# Patient Record
Sex: FEMALE | Race: WHITE | NOT HISPANIC OR LATINO | URBAN - METROPOLITAN AREA
[De-identification: names, ages, dates, MRNs, and addresses within clinical notes are randomized per-mention and may not be internally consistent; named-entity substitution may affect disease eponyms.]

---

## 2019-02-21 ENCOUNTER — OFFICE VISIT (OUTPATIENT)
Dept: URBAN - METROPOLITAN AREA CLINIC 48 | Facility: CLINIC | Age: 64
End: 2019-02-21
Payer: MEDICARE

## 2019-02-21 PROCEDURE — 92134 CPTRZ OPH DX IMG PST SGM RTA: CPT | Performed by: OPHTHALMOLOGY

## 2019-02-21 PROCEDURE — 99214 OFFICE O/P EST MOD 30 MIN: CPT | Performed by: OPHTHALMOLOGY

## 2019-02-21 RX ORDER — OFLOXACIN 3 MG/ML
0.3 % SOLUTION/ DROPS OPHTHALMIC
Qty: 1 | Refills: 0 | Status: INACTIVE
Start: 2019-02-21 | End: 2019-07-26

## 2019-02-21 RX ORDER — PREDNISOLONE ACETATE 10 MG/ML
1 % SUSPENSION/ DROPS OPHTHALMIC
Qty: 1 | Refills: 1 | Status: INACTIVE
Start: 2019-02-21 | End: 2019-08-15

## 2019-02-21 ASSESSMENT — INTRAOCULAR PRESSURE
OD: 15
OS: 16

## 2019-02-21 NOTE — IMPRESSION/PLAN
Impression: Macular cyst, hole, or pseudohole, right eye: H35.341. Plan: Rec surgical intervention. Discussed main goal of surgery is to prevent worsening and diminish distortions. Discussed uncertain visual prognosis- cannot predict if and how much VA will improve. Discussed final vision may take months to achieve. R/B/A discussed including but not limited to bleeding, infection, retinal tear/detachment, increased or decreased IOP, cataract if phakic, failure to accomplish surgical goals, need for repeat surgery, loss of vision, loss of eye, anesthetic complications (to be further discussed with anesthesiologist on day of sx), death. Discussed post op restricted positioning as well as travel restrictions (ie- no flying or traveling by car to [facility]). Pt would like to proceed. RL2. 
Plan for PPV, MP, gas OD. (Oflox/PF QID -start day after sx)

## 2019-02-21 NOTE — IMPRESSION/PLAN
Impression: Puckering of macula, left eye: H35.372. Plan: ERM w/lamellar hole; Will cont to observe. Instructed to call clinic urgently if new/worsening metamorphopsia or scotoma.

## 2019-03-15 ENCOUNTER — OFFICE VISIT (OUTPATIENT)
Dept: URBAN - METROPOLITAN AREA CLINIC 48 | Facility: CLINIC | Age: 64
End: 2019-03-15
Payer: MEDICARE

## 2019-03-15 DIAGNOSIS — H43.12 VITREOUS HEMORRHAGE, LEFT EYE: Primary | ICD-10-CM

## 2019-03-15 PROCEDURE — 76512 OPH US DX B-SCAN: CPT | Performed by: OPTOMETRIST

## 2019-03-15 PROCEDURE — 92014 COMPRE OPH EXAM EST PT 1/>: CPT | Performed by: OPTOMETRIST

## 2019-03-15 ASSESSMENT — INTRAOCULAR PRESSURE
OD: 17
OS: 20

## 2019-03-15 NOTE — IMPRESSION/PLAN
Impression: Vitreous hemorrhage, left eye: H43.12. Retina grossly flat and intact - poor view due to heme - 
 Plan: BScan done today - Refer to Dr Caryle Nixon next week for eval and recommendation for Tx as needed - Pt already scheduled for vitrectomy and macular ERM peel.

## 2019-03-21 ENCOUNTER — OFFICE VISIT (OUTPATIENT)
Dept: URBAN - METROPOLITAN AREA CLINIC 48 | Facility: CLINIC | Age: 64
End: 2019-03-21
Payer: MEDICARE

## 2019-03-21 PROCEDURE — 67028 INJECTION EYE DRUG: CPT | Performed by: OPHTHALMOLOGY

## 2019-03-21 PROCEDURE — 92134 CPTRZ OPH DX IMG PST SGM RTA: CPT | Performed by: OPHTHALMOLOGY

## 2019-03-21 PROCEDURE — 99214 OFFICE O/P EST MOD 30 MIN: CPT | Performed by: OPHTHALMOLOGY

## 2019-03-21 ASSESSMENT — INTRAOCULAR PRESSURE
OD: 16
OS: 18

## 2019-03-21 NOTE — IMPRESSION/PLAN
Impression: Vitreous hemorrhage, left eye: H43.12. Plan: VH w/poss NVE seen in today's exam. Recommend avastin injection today followed by surgery considering pt is monocular. Pt would like to proceed. R/B/A of antivegf injection discussed with pt. Consent obtained. Also discussed R/B/A's of procedure including bleeding, infection, retinal tear/detachment, increased or decreased IOP, cataract, failure to accomplish surgical goals, need for repeat surgery, loss of vision, loss of eye, anesthetic complications (to be further reviewed with anesthesiologist on day of surgery), death. RL2.  Plan for PPV, EL, OS (pt has gtts already, Oflx/PF QID -start day after sx)

## 2019-03-21 NOTE — IMPRESSION/PLAN
Impression: Macular cyst, hole, or pseudohole, right eye: H35.341. Plan: Will hold off on PPV, MP OD until sometime in June 2019. Observe for now.

## 2019-04-04 ENCOUNTER — SURGERY (OUTPATIENT)
Dept: URBAN - METROPOLITAN AREA SURGERY 26 | Facility: SURGERY | Age: 64
End: 2019-04-04
Payer: MEDICARE

## 2019-04-04 PROCEDURE — 67039 LASER TREATMENT OF RETINA: CPT | Performed by: OPHTHALMOLOGY

## 2019-04-05 ENCOUNTER — POST-OPERATIVE VISIT (OUTPATIENT)
Dept: URBAN - METROPOLITAN AREA CLINIC 48 | Facility: CLINIC | Age: 64
End: 2019-04-05
Payer: MEDICARE

## 2019-04-05 PROCEDURE — 99024 POSTOP FOLLOW-UP VISIT: CPT | Performed by: OPHTHALMOLOGY

## 2019-04-05 ASSESSMENT — INTRAOCULAR PRESSURE: OS: 16

## 2019-04-10 ENCOUNTER — POST-OPERATIVE VISIT (OUTPATIENT)
Dept: URBAN - METROPOLITAN AREA CLINIC 48 | Facility: CLINIC | Age: 64
End: 2019-04-10
Payer: MEDICARE

## 2019-04-10 PROCEDURE — 99024 POSTOP FOLLOW-UP VISIT: CPT | Performed by: OPHTHALMOLOGY

## 2019-04-10 ASSESSMENT — INTRAOCULAR PRESSURE: OS: 9

## 2019-05-02 ENCOUNTER — POST-OPERATIVE VISIT (OUTPATIENT)
Dept: URBAN - METROPOLITAN AREA CLINIC 48 | Facility: CLINIC | Age: 64
End: 2019-05-02
Payer: MEDICARE

## 2019-05-02 PROCEDURE — 99024 POSTOP FOLLOW-UP VISIT: CPT | Performed by: OPHTHALMOLOGY

## 2019-05-02 ASSESSMENT — INTRAOCULAR PRESSURE
OD: 12
OS: 11

## 2019-05-30 ENCOUNTER — POST-OPERATIVE VISIT (OUTPATIENT)
Dept: URBAN - METROPOLITAN AREA CLINIC 48 | Facility: CLINIC | Age: 64
End: 2019-05-30
Payer: MEDICARE

## 2019-05-30 PROCEDURE — 99024 POSTOP FOLLOW-UP VISIT: CPT | Performed by: OPHTHALMOLOGY

## 2019-05-30 PROCEDURE — 67028 INJECTION EYE DRUG: CPT | Performed by: OPHTHALMOLOGY

## 2019-05-30 ASSESSMENT — INTRAOCULAR PRESSURE
OS: 14
OD: 17

## 2019-06-19 ENCOUNTER — OFFICE VISIT (OUTPATIENT)
Dept: URBAN - METROPOLITAN AREA CLINIC 48 | Facility: CLINIC | Age: 64
End: 2019-06-19
Payer: MEDICARE

## 2019-06-19 DIAGNOSIS — H35.341 MACULAR HOLE OF RIGHT EYE: ICD-10-CM

## 2019-06-19 DIAGNOSIS — H25.811 COMBINED FORMS OF AGE-RELATED CATARACT, RIGHT EYE: Primary | ICD-10-CM

## 2019-06-19 PROCEDURE — 92014 COMPRE OPH EXAM EST PT 1/>: CPT | Performed by: OPHTHALMOLOGY

## 2019-06-19 ASSESSMENT — VISUAL ACUITY
OS: 20/40
OD: 20/400

## 2019-06-19 ASSESSMENT — KERATOMETRY
OS: 43.13
OD: 43.13

## 2019-06-19 ASSESSMENT — INTRAOCULAR PRESSURE
OS: 11
OD: 14

## 2019-06-19 NOTE — IMPRESSION/PLAN
Impression: Macular hole of right eye: H35.341. Plan: Discussed diagnosis in detail with patient. Reassured patient of current condition and treatment.  Patient to continue monitoring with Dr Concepcion Garcia.

## 2019-06-19 NOTE — IMPRESSION/PLAN
Impression: Combined forms of age-related cataract, right eye: H25.811. Plan: The patient has a visually significant cataract in right eye, after discussion with the patient and careful examination it has been determined that a cataract in right eye is accounting for a significant amount of the patient's visual symptoms. Cataract surgery and the associated risks, benefits, alternatives, expectations, and recovery were discussed in detail with the patient. All questions were answered. The patient understands that there will be a guarded prognosis of visual improvement, given macular hole in right eye. The patient desires cataract surgery in right eye. Patient desires standard lens for distance target. Patient is to start  Prednisolone, Ketorolac, Ofloxacin  QID or Pred-Gati-Brim to operative eye one day prior to surgery, and continue after surgery. Schedule cataract surgery in right eye only.  RL 2

## 2019-06-27 ENCOUNTER — PROCEDURE (OUTPATIENT)
Dept: URBAN - METROPOLITAN AREA CLINIC 48 | Facility: CLINIC | Age: 64
End: 2019-06-27
Payer: MEDICARE

## 2019-06-27 PROCEDURE — 67028 INJECTION EYE DRUG: CPT | Performed by: OPHTHALMOLOGY

## 2019-07-15 ENCOUNTER — TESTING ONLY (OUTPATIENT)
Dept: URBAN - METROPOLITAN AREA CLINIC 48 | Facility: CLINIC | Age: 64
End: 2019-07-15
Payer: MEDICARE

## 2019-07-15 DIAGNOSIS — H25.813 COMBINED FORMS OF AGE-RELATED CATARACT, BILATERAL: Primary | ICD-10-CM

## 2019-07-15 PROCEDURE — 92136 OPHTHALMIC BIOMETRY: CPT | Performed by: OPHTHALMOLOGY

## 2019-07-15 RX ORDER — KETOROLAC TROMETHAMINE 5 MG/ML
0.5 % SOLUTION OPHTHALMIC
Qty: 1 | Refills: 1 | Status: INACTIVE
Start: 2019-07-15 | End: 2019-07-26

## 2019-07-15 RX ORDER — KETOROLAC TROMETHAMINE 5 MG/ML
0.5 % SOLUTION OPHTHALMIC
Qty: 1 | Refills: 1 | Status: INACTIVE
Start: 2019-07-15 | End: 2019-07-15

## 2019-07-15 ASSESSMENT — PACHYMETRY
OS: 23.32
OD: 23.25
OD: 3.04
OS: 3.08

## 2019-07-24 ENCOUNTER — SURGERY (OUTPATIENT)
Dept: URBAN - METROPOLITAN AREA SURGERY 26 | Facility: SURGERY | Age: 64
End: 2019-07-24
Payer: MEDICARE

## 2019-07-24 ENCOUNTER — POST-OPERATIVE VISIT (OUTPATIENT)
Dept: URBAN - METROPOLITAN AREA CLINIC 48 | Facility: CLINIC | Age: 64
End: 2019-07-24

## 2019-07-24 PROCEDURE — 99024 POSTOP FOLLOW-UP VISIT: CPT | Performed by: OPHTHALMOLOGY

## 2019-07-24 PROCEDURE — 66984 XCAPSL CTRC RMVL W/O ECP: CPT | Performed by: OPHTHALMOLOGY

## 2019-07-24 ASSESSMENT — INTRAOCULAR PRESSURE
OD: 20
OD: 20

## 2019-07-26 ENCOUNTER — OFFICE VISIT (OUTPATIENT)
Dept: URBAN - METROPOLITAN AREA CLINIC 48 | Facility: CLINIC | Age: 64
End: 2019-07-26
Payer: MEDICARE

## 2019-07-26 PROCEDURE — 92134 CPTRZ OPH DX IMG PST SGM RTA: CPT | Performed by: OPHTHALMOLOGY

## 2019-07-26 PROCEDURE — 99214 OFFICE O/P EST MOD 30 MIN: CPT | Performed by: OPHTHALMOLOGY

## 2019-07-26 ASSESSMENT — INTRAOCULAR PRESSURE
OS: 12
OD: 13

## 2019-07-26 NOTE — IMPRESSION/PLAN
Impression: Macular hole of right eye: H35.341. Plan:  Discussed diagnosis with patient. The clinical exam and OCT are consistent with Macular Hole. Given the patients current symptoms and difficulties with daily activities surgical correction is recommended. Recommend sx, after a through discussion of surgical R/B/A. The patient understands the potential risks of sx, including (but not limited to) bleeding, pain, infection, loss of vision, loss of eye and possible need for more sx. The patient was also informed of post operative altitude with caution with face down positioning with gas in the eye. The patient understand if the hole closes successfully the vision usually improves but does not return to normal an improvement may take several months to a year. The patient elects to proceed with sx.  RL-2

## 2019-07-26 NOTE — IMPRESSION/PLAN
Impression: Type 2 diabetes mellitus w/ proliferative diabetic retinopathy w/ macular edema, left eye: D15.0734. Plan: OCT ordered and performed today. The clinical exam is consistent with proliferative diabetic retinopathy. Discussed diagnosis with patient. Recommend close observation at this time W/ no injection at this time recommend re-eval in 1 mth. Discussed risk of progression with the present condition. The patient was advised to maintain tight blood sugar control, blood pressure and lipid control. Patient agrees with plan.

## 2019-07-30 ENCOUNTER — POST-OPERATIVE VISIT (OUTPATIENT)
Dept: URBAN - METROPOLITAN AREA CLINIC 48 | Facility: CLINIC | Age: 64
End: 2019-07-30
Payer: MEDICARE

## 2019-07-30 PROCEDURE — 99024 POSTOP FOLLOW-UP VISIT: CPT | Performed by: OPTOMETRIST

## 2019-07-30 ASSESSMENT — INTRAOCULAR PRESSURE: OD: 12

## 2019-08-14 ENCOUNTER — SURGERY (OUTPATIENT)
Dept: URBAN - METROPOLITAN AREA SURGERY 26 | Facility: SURGERY | Age: 64
End: 2019-08-14
Payer: MEDICARE

## 2019-08-14 PROCEDURE — 67042 VIT FOR MACULAR HOLE: CPT | Performed by: OPHTHALMOLOGY

## 2019-08-15 ENCOUNTER — POST-OPERATIVE VISIT (OUTPATIENT)
Dept: URBAN - METROPOLITAN AREA CLINIC 48 | Facility: CLINIC | Age: 64
End: 2019-08-15

## 2019-08-15 PROCEDURE — 99024 POSTOP FOLLOW-UP VISIT: CPT | Performed by: OPTOMETRIST

## 2019-08-15 RX ORDER — PREDNISOLONE ACETATE 10 MG/ML
1 % SUSPENSION/ DROPS OPHTHALMIC
Qty: 1 | Refills: 1 | Status: INACTIVE
Start: 2019-08-15 | End: 2019-09-20

## 2019-08-15 RX ORDER — OFLOXACIN 3 MG/ML
0.3 % SOLUTION/ DROPS OPHTHALMIC
Qty: 1 | Refills: 0 | Status: INACTIVE
Start: 2019-08-15 | End: 2019-09-20

## 2019-08-15 ASSESSMENT — INTRAOCULAR PRESSURE: OD: 9

## 2019-08-21 ENCOUNTER — POST-OPERATIVE VISIT (OUTPATIENT)
Dept: URBAN - METROPOLITAN AREA CLINIC 48 | Facility: CLINIC | Age: 64
End: 2019-08-21
Payer: MEDICARE

## 2019-08-21 DIAGNOSIS — H34.8320 TRIB RTNL VEIN OCCLUSION, LEFT EYE, WITH MACULAR EDEMA: ICD-10-CM

## 2019-08-21 PROCEDURE — 67028 INJECTION EYE DRUG: CPT | Performed by: OPHTHALMOLOGY

## 2019-08-21 PROCEDURE — 99024 POSTOP FOLLOW-UP VISIT: CPT | Performed by: OPHTHALMOLOGY

## 2019-08-21 ASSESSMENT — INTRAOCULAR PRESSURE
OD: 12
OS: 11

## 2019-09-20 ENCOUNTER — POST-OPERATIVE VISIT (OUTPATIENT)
Dept: URBAN - METROPOLITAN AREA CLINIC 48 | Facility: CLINIC | Age: 64
End: 2019-09-20

## 2019-09-20 PROCEDURE — 99024 POSTOP FOLLOW-UP VISIT: CPT | Performed by: OPHTHALMOLOGY

## 2019-09-20 RX ORDER — PREDNISOLONE ACETATE 10 MG/ML
1 % SUSPENSION/ DROPS OPHTHALMIC
Qty: 1 | Refills: 1 | Status: INACTIVE
Start: 2019-09-20 | End: 2019-10-23

## 2019-09-20 RX ORDER — KETOROLAC TROMETHAMINE 5 MG/ML
0.5 % SOLUTION OPHTHALMIC
Qty: 1 | Refills: 1 | Status: INACTIVE
Start: 2019-09-20 | End: 2020-01-24

## 2019-09-20 ASSESSMENT — INTRAOCULAR PRESSURE
OS: 12
OD: 13

## 2019-10-02 ENCOUNTER — POST-OPERATIVE VISIT (OUTPATIENT)
Dept: URBAN - METROPOLITAN AREA CLINIC 48 | Facility: CLINIC | Age: 64
End: 2019-10-02

## 2019-10-02 PROCEDURE — 99024 POSTOP FOLLOW-UP VISIT: CPT | Performed by: OPHTHALMOLOGY

## 2019-10-02 ASSESSMENT — INTRAOCULAR PRESSURE
OD: 17
OS: 14

## 2019-10-23 ENCOUNTER — POST-OPERATIVE VISIT (OUTPATIENT)
Dept: URBAN - METROPOLITAN AREA CLINIC 48 | Facility: CLINIC | Age: 64
End: 2019-10-23
Payer: MEDICARE

## 2019-10-23 PROCEDURE — 99024 POSTOP FOLLOW-UP VISIT: CPT | Performed by: OPHTHALMOLOGY

## 2019-10-23 RX ORDER — PREDNISOLONE ACETATE 10 MG/ML
1 % SUSPENSION/ DROPS OPHTHALMIC
Qty: 1 | Refills: 2 | Status: INACTIVE
Start: 2019-10-23 | End: 2019-11-06

## 2019-10-23 ASSESSMENT — INTRAOCULAR PRESSURE
OD: 16
OS: 12

## 2019-12-04 ENCOUNTER — OFFICE VISIT (OUTPATIENT)
Dept: URBAN - METROPOLITAN AREA CLINIC 48 | Facility: CLINIC | Age: 64
End: 2019-12-04
Payer: MEDICARE

## 2019-12-04 DIAGNOSIS — H35.372 PUCKERING OF MACULA, LEFT EYE: ICD-10-CM

## 2019-12-04 PROCEDURE — 92134 CPTRZ OPH DX IMG PST SGM RTA: CPT | Performed by: OPHTHALMOLOGY

## 2019-12-04 PROCEDURE — 92014 COMPRE OPH EXAM EST PT 1/>: CPT | Performed by: OPHTHALMOLOGY

## 2019-12-04 RX ORDER — PREDNISOLONE ACETATE 10 MG/ML
1 % SUSPENSION/ DROPS OPHTHALMIC
Qty: 10 | Refills: 2 | Status: INACTIVE
Start: 2019-12-04 | End: 2019-12-04

## 2019-12-04 ASSESSMENT — INTRAOCULAR PRESSURE
OS: 15
OD: 15

## 2019-12-04 ASSESSMENT — KERATOMETRY: OS: 43.38

## 2019-12-04 ASSESSMENT — VISUAL ACUITY
OD: 20/70
OS: 20/50

## 2019-12-04 NOTE — IMPRESSION/PLAN
Impression: Type 2 diabetes mellitus w/ proliferative diabetic retinopathy w/ macular edema, left eye: T61.2155. Plan: Advised to obtain good blood glucose control, Patient to use PF tid OU Schedule Avastin OS x 1 then 1st or 2nd week of January 2020 with Dr. Ricky Calvillo Please get auth

## 2019-12-04 NOTE — IMPRESSION/PLAN
Impression: Age-related nuclear cataract, left eye: H25.12. Plan: The patient has a visually significant cataract in the left eye. After discussion with the patient and careful examination it has been determined that a cataract in the left eye is accounting for a significant amount of the patient's visual symptoms. Cataract surgery and the associated risks, benefits, alternatives, expectations, and recovery were discussed in detail with the patient. All questions were answered. The patient understands that there may be some limitation in visual potential given any pre-existing ocular disease. The patient desires cataract surgery in the left eye. Patient elects Prednisolone, Ketorolac and Ofloxacin drosp for surgery, patient understands side effects to drops. Schedule cataract surgery in the left eye. RL 2 Please educate patient with premium work up if patient interested. Hold off on surgery at this time until Dr. Janusz Michaud approves surgery.

## 2019-12-06 ENCOUNTER — PROCEDURE (OUTPATIENT)
Dept: URBAN - METROPOLITAN AREA CLINIC 48 | Facility: CLINIC | Age: 64
End: 2019-12-06
Payer: MEDICARE

## 2019-12-06 DIAGNOSIS — E11.3512 TYPE 2 DIAB WITH PROLIF DIAB RTNOP WITH MACULAR EDEMA, L EYE: Primary | ICD-10-CM

## 2019-12-06 PROCEDURE — 67028 INJECTION EYE DRUG: CPT | Performed by: OPHTHALMOLOGY

## 2020-01-24 ENCOUNTER — OFFICE VISIT (OUTPATIENT)
Dept: URBAN - METROPOLITAN AREA CLINIC 48 | Facility: CLINIC | Age: 65
End: 2020-01-24
Payer: MEDICARE

## 2020-01-24 DIAGNOSIS — E11.3592 TYPE 2 DIABETES MELLITUS W/ PROLIFERATIVE DIABETIC RETINOPATHY W/O MACULAR EDEMA, LEFT EYE: ICD-10-CM

## 2020-01-24 DIAGNOSIS — H25.12 AGE-RELATED NUCLEAR CATARACT, LEFT EYE: ICD-10-CM

## 2020-01-24 DIAGNOSIS — E11.3311 TYPE 2 DIAB W MODERATE NONPRLF DIAB RTNOP W MACULAR EDEMA, RIGHT EYE: ICD-10-CM

## 2020-01-24 DIAGNOSIS — H35.342 MACULAR CYST, HOLE, OR PSEUDOHOLE, LEFT EYE: Primary | ICD-10-CM

## 2020-01-24 PROCEDURE — 92134 CPTRZ OPH DX IMG PST SGM RTA: CPT | Performed by: OPHTHALMOLOGY

## 2020-01-24 PROCEDURE — 99214 OFFICE O/P EST MOD 30 MIN: CPT | Performed by: OPHTHALMOLOGY

## 2020-01-24 ASSESSMENT — INTRAOCULAR PRESSURE
OS: 15
OD: 14

## 2020-01-24 NOTE — IMPRESSION/PLAN
Impression: Macular cyst, hole, or pseudohole, left eye: H35.342. OS. Plan: OCT ordered and performed today. Discussed diagnosis with patient. The clinical exam and OCT are consistent with Macular Hole. Given the patients current symptoms and difficulties with daily activities surgical correction is recommended. Recommend sx, after a through discussion of surgical R/B/A. The patient understands the potential risks of sx, including (but not limited to) bleeding, pain, infection, loss of vision, loss of eye and possible need for more sx. The patient was also informed of post operative altitude with caution with face down positioning with gas in the eye. The patient understand if the hole closes successfully the vision usually improves but does not return to normal an improvement may take several months to a year. The patient elects to proceed with sx in the left eye. RL-2 Recommend combined CE-IOL and Mac hole repair OS. Patient to taper off PF, currently instilling TID.

## 2020-01-24 NOTE — IMPRESSION/PLAN
Impression: Type 2 diab w moderate nonprlf diab rtnop w macular edema, right eye: e11.3311. OD. Plan: OCT ordered and performed today. Discussed diagnosis with patient. The clinical exam was consistent with Type 2 diabetes. The patient was advised to maintain tight blood sugar control, blood pressure and lipid control. Recommend observation at this time.

## 2020-01-24 NOTE — IMPRESSION/PLAN
Impression: Age-related nuclear cataract, left eye: H25.12. OS. Plan: Patient had an eval with Dr. Nevin Obando, recommend proceed with CE surgery in the left eye combined with macular hole repair.  Patient will need an A scan

## 2020-01-24 NOTE — IMPRESSION/PLAN
Impression: Type 2 diabetes mellitus w/ proliferative diabetic retinopathy w/o macular edema, left eye: E27.5614. OS. Plan: OCT ordered and performed today. The clinical exam is consistent with proliferative diabetic retinopathy. Discussed diagnosis with patient. Recommend close observation at this time. Discussed risk of progression with the present condition. The patient was advised to maintain tight blood sugar control, blood pressure and lipid control. Patient agrees with plan.

## 2020-02-05 ENCOUNTER — SURGERY (OUTPATIENT)
Dept: URBAN - METROPOLITAN AREA SURGERY 26 | Facility: SURGERY | Age: 65
End: 2020-02-05
Payer: MEDICARE

## 2020-02-05 PROCEDURE — 67041 VIT FOR MACULAR PUCKER: CPT | Performed by: OPHTHALMOLOGY

## 2020-02-05 PROCEDURE — 66984 XCAPSL CTRC RMVL W/O ECP: CPT | Performed by: OPHTHALMOLOGY

## 2020-02-05 RX ORDER — HYDROCODONE BITARTRATE AND IBUPROFEN 7.5; 2 MG/1; MG/1
TABLET, FILM COATED ORAL
Qty: 6 | Refills: 0 | Status: INACTIVE
Start: 2020-02-05 | End: 2020-07-22

## 2020-02-06 ENCOUNTER — POST-OPERATIVE VISIT (OUTPATIENT)
Dept: URBAN - METROPOLITAN AREA CLINIC 48 | Facility: CLINIC | Age: 65
End: 2020-02-06

## 2020-02-06 PROCEDURE — 99024 POSTOP FOLLOW-UP VISIT: CPT | Performed by: OPHTHALMOLOGY

## 2020-02-06 ASSESSMENT — INTRAOCULAR PRESSURE: OS: 20

## 2020-02-07 ENCOUNTER — POST-OPERATIVE VISIT (OUTPATIENT)
Dept: URBAN - METROPOLITAN AREA CLINIC 48 | Facility: CLINIC | Age: 65
End: 2020-02-07
Payer: MEDICARE

## 2020-02-07 PROCEDURE — 99024 POSTOP FOLLOW-UP VISIT: CPT | Performed by: OPHTHALMOLOGY

## 2020-02-07 ASSESSMENT — INTRAOCULAR PRESSURE
OS: 18
OD: 14

## 2020-02-12 ENCOUNTER — POST-OPERATIVE VISIT (OUTPATIENT)
Dept: URBAN - METROPOLITAN AREA CLINIC 48 | Facility: CLINIC | Age: 65
End: 2020-02-12

## 2020-02-12 PROCEDURE — 99024 POSTOP FOLLOW-UP VISIT: CPT | Performed by: OPHTHALMOLOGY

## 2020-02-12 ASSESSMENT — INTRAOCULAR PRESSURE
OD: 13
OS: 13

## 2020-02-28 ENCOUNTER — POST-OPERATIVE VISIT (OUTPATIENT)
Dept: URBAN - METROPOLITAN AREA CLINIC 48 | Facility: CLINIC | Age: 65
End: 2020-02-28

## 2020-02-28 PROCEDURE — 99024 POSTOP FOLLOW-UP VISIT: CPT | Performed by: OPTOMETRIST

## 2020-02-28 ASSESSMENT — INTRAOCULAR PRESSURE: OS: 14

## 2020-03-11 ENCOUNTER — POST-OPERATIVE VISIT (OUTPATIENT)
Dept: URBAN - METROPOLITAN AREA CLINIC 48 | Facility: CLINIC | Age: 65
End: 2020-03-11

## 2020-03-11 DIAGNOSIS — Z09 ENCNTR FOR F/U EXAM AFT TRTMT FOR COND OTH THAN MALIG NEOPLM: Primary | ICD-10-CM

## 2020-03-11 PROCEDURE — 99024 POSTOP FOLLOW-UP VISIT: CPT | Performed by: OPHTHALMOLOGY

## 2020-03-11 ASSESSMENT — INTRAOCULAR PRESSURE: OS: 15

## 2020-04-17 ENCOUNTER — POST-OPERATIVE VISIT (OUTPATIENT)
Dept: URBAN - METROPOLITAN AREA CLINIC 48 | Facility: CLINIC | Age: 65
End: 2020-04-17
Payer: MEDICARE

## 2020-04-17 PROCEDURE — 99024 POSTOP FOLLOW-UP VISIT: CPT | Performed by: OPHTHALMOLOGY

## 2020-04-17 ASSESSMENT — INTRAOCULAR PRESSURE
OD: 8
OS: 12

## 2020-05-15 ENCOUNTER — OFFICE VISIT (OUTPATIENT)
Dept: URBAN - METROPOLITAN AREA CLINIC 48 | Facility: CLINIC | Age: 65
End: 2020-05-15
Payer: MEDICARE

## 2020-05-15 DIAGNOSIS — E11.3313 TYPE 2 DIAB W MODERATE NONPRLF DIAB RTNOP W MACULAR EDEMA, BILATERAL: Primary | ICD-10-CM

## 2020-05-15 PROCEDURE — 99213 OFFICE O/P EST LOW 20 MIN: CPT | Performed by: OPHTHALMOLOGY

## 2020-05-15 PROCEDURE — 92134 CPTRZ OPH DX IMG PST SGM RTA: CPT | Performed by: OPHTHALMOLOGY

## 2020-05-15 ASSESSMENT — INTRAOCULAR PRESSURE
OS: 17
OD: 17

## 2020-05-15 NOTE — IMPRESSION/PLAN
Impression: Type 2 diab w moderate nonprlf diab rtnop w macular edema, bilateral: E83.2808. OU. Plan: OCT ordered and performed today. Discussed diagnosis with patient. The clinical exam was consistent with Type 2 diabetes. The patient was advised to maintain tight blood sugar control, blood pressure and lipid control. Patient was also advised to keep all appointments with PCP for diabetic evaluation and counseling to avoid the systemic complications of diabetes.

## 2020-05-21 ENCOUNTER — OFFICE VISIT (OUTPATIENT)
Dept: URBAN - METROPOLITAN AREA CLINIC 48 | Facility: CLINIC | Age: 65
End: 2020-05-21
Payer: MEDICARE

## 2020-05-21 DIAGNOSIS — H10.823 ROSACEA CONJUNCTIVITIS, BILATERAL: Primary | ICD-10-CM

## 2020-05-21 PROCEDURE — 92012 INTRM OPH EXAM EST PATIENT: CPT | Performed by: OPTOMETRIST

## 2020-05-21 NOTE — IMPRESSION/PLAN
Impression: Rosacea conjunctivitis, bilateral: L43.494. Patient deferred gtts today  Plan: Discussed diagnosis with patient. Patient to start lid scrubs and warm compresses. Patient to use Oflox TID OU until patient sees Dermatologist in a couple of days.  

keep next appt with Dr. Jayme Mcclellan

## 2020-07-22 ENCOUNTER — OFFICE VISIT (OUTPATIENT)
Dept: URBAN - METROPOLITAN AREA CLINIC 48 | Facility: CLINIC | Age: 65
End: 2020-07-22
Payer: MEDICARE

## 2020-07-22 PROCEDURE — 99213 OFFICE O/P EST LOW 20 MIN: CPT | Performed by: OPHTHALMOLOGY

## 2020-07-22 PROCEDURE — 92134 CPTRZ OPH DX IMG PST SGM RTA: CPT | Performed by: OPHTHALMOLOGY

## 2020-07-22 ASSESSMENT — INTRAOCULAR PRESSURE
OS: 14
OD: 15

## 2020-07-22 NOTE — IMPRESSION/PLAN
Impression: Type 2 diabetes mellitus w/ proliferative diabetic retinopathy w/o macular edema, bilateral: E81.6987. Plan: OCT ordered and performed today. The clinical exam is consistent with proliferative diabetic retinopathy. Discussed diagnosis with patient. Recommend close observation at this time. Discussed risk of progression with the present condition. The patient was advised to maintain tight blood sugar control, blood pressure and lipid control. Patient agrees with plan.

## 2020-10-28 ENCOUNTER — OFFICE VISIT (OUTPATIENT)
Dept: URBAN - METROPOLITAN AREA CLINIC 48 | Facility: CLINIC | Age: 65
End: 2020-10-28
Payer: MEDICARE

## 2020-10-28 PROCEDURE — 92134 CPTRZ OPH DX IMG PST SGM RTA: CPT | Performed by: OPHTHALMOLOGY

## 2020-10-28 PROCEDURE — 99213 OFFICE O/P EST LOW 20 MIN: CPT | Performed by: OPHTHALMOLOGY

## 2020-10-28 ASSESSMENT — INTRAOCULAR PRESSURE
OS: 13
OD: 15

## 2020-10-28 NOTE — IMPRESSION/PLAN
Impression: Type 2 diabetes mellitus w/ proliferative diabetic retinopathy w/o macular edema, bilateral: T27.6583. Plan: OCT ordered and performed today. The clinical exam is consistent with proliferative diabetic retinopathy. Discussed diagnosis with patient. Recommend close observation at this time. Discussed risk of progression with the present condition. The patient was advised to maintain tight blood sugar control, blood pressure and lipid control. Patient agrees with plan.

## 2021-01-27 ENCOUNTER — OFFICE VISIT (OUTPATIENT)
Dept: URBAN - METROPOLITAN AREA CLINIC 48 | Facility: CLINIC | Age: 66
End: 2021-01-27
Payer: COMMERCIAL

## 2021-01-27 DIAGNOSIS — E11.3593 TYPE 2 DIABETES MELLITUS W/ PROLIFERATIVE DIABETIC RETINOPATHY W/O MACULAR EDEMA, BILATERAL: Primary | ICD-10-CM

## 2021-01-27 PROCEDURE — 99213 OFFICE O/P EST LOW 20 MIN: CPT | Performed by: OPHTHALMOLOGY

## 2021-01-27 PROCEDURE — 92134 CPTRZ OPH DX IMG PST SGM RTA: CPT | Performed by: OPHTHALMOLOGY

## 2021-01-27 ASSESSMENT — INTRAOCULAR PRESSURE
OD: 15
OS: 16

## 2021-01-27 NOTE — IMPRESSION/PLAN
Impression: Type 2 diabetes mellitus w/ proliferative diabetic retinopathy w/o macular edema, bilateral: W66.8515. Plan: OCT ordered and performed today. The clinical exam is consistent with proliferative diabetic retinopathy. Discussed diagnosis with patient. Recommend close observation at this time. Discussed risk of progression with the present condition. The patient was advised to maintain tight blood sugar control, blood pressure and lipid control. Patient agrees with plan.

## 2022-07-11 ENCOUNTER — OFFICE VISIT (OUTPATIENT)
Dept: URBAN - METROPOLITAN AREA CLINIC 48 | Facility: CLINIC | Age: 67
End: 2022-07-11
Payer: MEDICARE

## 2022-07-11 DIAGNOSIS — Z96.1 PRESENCE OF INTRAOCULAR LENS: ICD-10-CM

## 2022-07-11 DIAGNOSIS — H26.492 OTHER SECONDARY CATARACT, LEFT EYE: ICD-10-CM

## 2022-07-11 DIAGNOSIS — E11.3593 TYPE 2 DIABETES MELLITUS W/ PROLIFERATIVE DIABETIC RETINOPATHY W/O MACULAR EDEMA, BILATERAL: Primary | ICD-10-CM

## 2022-07-11 DIAGNOSIS — E11.3211 TYPE 2 DIABETES MELLITUS WITH MILD NONPROLIFERATIVE DIABETIC RETINOPATHY WITH MACULAR EDEMA, RIGHT EYE: ICD-10-CM

## 2022-07-11 DIAGNOSIS — H35.373 PUCKERING OF MACULA, BILATERAL: ICD-10-CM

## 2022-07-11 PROCEDURE — 92134 CPTRZ OPH DX IMG PST SGM RTA: CPT | Performed by: OPHTHALMOLOGY

## 2022-07-11 PROCEDURE — 99213 OFFICE O/P EST LOW 20 MIN: CPT | Performed by: OPHTHALMOLOGY

## 2022-07-11 RX ORDER — POTASSIUM CHLORIDE 600 MG/1
CAPSULE, EXTENDED RELEASE ORAL
Qty: 0 | Refills: 0 | Status: ACTIVE
Start: 2022-07-11

## 2022-07-11 ASSESSMENT — INTRAOCULAR PRESSURE
OD: 15
OS: 19

## 2022-07-11 NOTE — IMPRESSION/PLAN
Impression: Type 2 diabetes mellitus w/ proliferative diabetic retinopathy w/o macular edema, bilateral: e11.3593.
- Avastin OS 12/06/19
-s/p PRP OU

OCT: 07/11/22 OD: flat, atrophy OS: mild DME (improving) Plan: Thorough examination reveals quiescent proliferative diabetic retinopathy following panretinal photocoagulation. The importance of blood sugar, blood pressure, and cholesterol control; and their relationship to progression of diabetic retinopathy were reviewed with the patient. The patient was urged to work closely with their PCP to avoid systemic complications of diabetic disease. 

RTC 6 months DFE/OCT OU re-eval

## 2022-07-11 NOTE — IMPRESSION/PLAN
Impression: Puckering of macula, bilateral: H35.373. Bilateral.
-s/p ERM peel OU Plan: Doing well. SSRD.

## 2023-02-08 ENCOUNTER — OFFICE VISIT (OUTPATIENT)
Facility: LOCATION | Age: 68
End: 2023-02-08
Payer: MEDICARE

## 2023-02-08 DIAGNOSIS — H35.373 PUCKERING OF MACULA, BILATERAL: ICD-10-CM

## 2023-02-08 DIAGNOSIS — Z96.1 PRESENCE OF INTRAOCULAR LENS: ICD-10-CM

## 2023-02-08 DIAGNOSIS — E11.3593 TYPE 2 DIABETES MELLITUS W/ PROLIFERATIVE DIABETIC RETINOPATHY W/O MACULAR EDEMA, BILATERAL: Primary | ICD-10-CM

## 2023-02-08 DIAGNOSIS — H26.492 OTHER SECONDARY CATARACT, LEFT EYE: ICD-10-CM

## 2023-02-08 PROCEDURE — 99213 OFFICE O/P EST LOW 20 MIN: CPT | Performed by: OPHTHALMOLOGY

## 2023-02-08 PROCEDURE — 92134 CPTRZ OPH DX IMG PST SGM RTA: CPT | Performed by: OPHTHALMOLOGY

## 2023-02-08 ASSESSMENT — INTRAOCULAR PRESSURE
OD: 13
OS: 13

## 2023-02-08 NOTE — IMPRESSION/PLAN
Impression: Type 2 diabetes mellitus w/ proliferative diabetic retinopathy w/o macular edema, bilateral: e11.3593.
- Avastin OS 12/06/19
-s/p PRP OU

OCT: 02/08/23 OD: flat, atrophy OS: mild DME (improving) Plan: Thorough examination reveals quiescent proliferative diabetic retinopathy following panretinal photocoagulation. The importance of blood sugar, blood pressure, and cholesterol control; and their relationship to progression of diabetic retinopathy were reviewed with the patient. The patient was urged to work closely with their PCP to avoid systemic complications of diabetic disease. 

RTC 6 months DFE/OCT OU re-eval

## 2023-03-27 ENCOUNTER — OFFICE VISIT (OUTPATIENT)
Dept: URBAN - METROPOLITAN AREA CLINIC 48 | Facility: CLINIC | Age: 68
End: 2023-03-27
Payer: MEDICARE

## 2023-03-27 DIAGNOSIS — H26.492 OTHER SECONDARY CATARACT, LEFT EYE: Primary | ICD-10-CM

## 2023-03-27 PROCEDURE — 99214 OFFICE O/P EST MOD 30 MIN: CPT | Performed by: STUDENT IN AN ORGANIZED HEALTH CARE EDUCATION/TRAINING PROGRAM

## 2023-03-27 ASSESSMENT — INTRAOCULAR PRESSURE
OS: 11
OD: 11

## 2023-03-27 ASSESSMENT — KERATOMETRY
OD: 43.75
OS: 44.00

## 2023-03-27 NOTE — IMPRESSION/PLAN
Impression: Other secondary cataract, left eye: H26.492. Plan: Risks, benefits, alternatives, and expectations of YAG capsulotomy were discussed. The patient desires a YAG capsulotomy in the left eye. Schedule YAG in the left eye.  RL 2

## 2023-04-24 ENCOUNTER — LASER (OUTPATIENT)
Dept: URBAN - METROPOLITAN AREA SURGERY 24 | Facility: SURGERY | Age: 68
End: 2023-04-24
Payer: MEDICARE

## 2023-04-24 ENCOUNTER — Encounter (OUTPATIENT)
Dept: URBAN - METROPOLITAN AREA SURGERY 25 | Facility: SURGERY | Age: 68
End: 2023-04-24
Payer: MEDICARE

## 2023-04-24 PROCEDURE — 66821 AFTER CATARACT LASER SURGERY: CPT | Performed by: STUDENT IN AN ORGANIZED HEALTH CARE EDUCATION/TRAINING PROGRAM

## 2023-05-01 ENCOUNTER — POST-OPERATIVE VISIT (OUTPATIENT)
Dept: URBAN - METROPOLITAN AREA CLINIC 48 | Facility: CLINIC | Age: 68
End: 2023-05-01
Payer: MEDICARE

## 2023-05-01 DIAGNOSIS — Z48.810 ENCOUNTER FOR SURGICAL AFTERCARE FOLLOWING SURGERY ON A SENSE ORGAN: Primary | ICD-10-CM

## 2023-05-01 PROCEDURE — 99024 POSTOP FOLLOW-UP VISIT: CPT | Performed by: STUDENT IN AN ORGANIZED HEALTH CARE EDUCATION/TRAINING PROGRAM

## 2023-05-01 ASSESSMENT — INTRAOCULAR PRESSURE: OS: 13

## 2023-05-01 NOTE — IMPRESSION/PLAN
Impression: S/P YAG Capsulotomy (Yttrium Aluminum [place]) OS - 7 Days. Encounter for surgical aftercare following surgery on a sense organ  Z48.810.  Plan: Vision doing well, IOP withing good range, Open PC.


RTC  3 months for DFE/OCT

## 2023-06-06 ENCOUNTER — OFFICE VISIT (OUTPATIENT)
Dept: URBAN - METROPOLITAN AREA CLINIC 48 | Facility: CLINIC | Age: 68
End: 2023-06-06
Payer: MEDICARE

## 2023-06-06 DIAGNOSIS — E11.3512 TYPE 2 DIABETES MELLITUS W/ PROLIFERATIVE DIABETIC RETINOPATHY W/ MACULAR EDEMA, LEFT EYE: Primary | ICD-10-CM

## 2023-06-06 PROCEDURE — 92134 CPTRZ OPH DX IMG PST SGM RTA: CPT | Performed by: STUDENT IN AN ORGANIZED HEALTH CARE EDUCATION/TRAINING PROGRAM

## 2023-06-06 PROCEDURE — 99214 OFFICE O/P EST MOD 30 MIN: CPT | Performed by: STUDENT IN AN ORGANIZED HEALTH CARE EDUCATION/TRAINING PROGRAM

## 2023-06-06 ASSESSMENT — INTRAOCULAR PRESSURE
OS: 14
OD: 19

## 2023-06-06 NOTE — IMPRESSION/PLAN
Impression: Type 2 diabetes mellitus w/ proliferative diabetic retinopathy w/ macular edema, left eye: X03.4927. OCT MAC findings: trace CME OD, Increased CME OS  Plan: Will refer to Dr. Penny Villalta due to increase in CME OS. RTC 4-6 wks with Dr. Penny Villalta.

## 2023-08-07 ENCOUNTER — OFFICE VISIT (OUTPATIENT)
Facility: LOCATION | Age: 68
End: 2023-08-07
Payer: MEDICARE

## 2023-08-07 DIAGNOSIS — E11.3593 TYPE 2 DIABETES MELLITUS W/ PROLIFERATIVE DIABETIC RETINOPATHY W/O MACULAR EDEMA, BILATERAL: Primary | ICD-10-CM

## 2023-08-07 DIAGNOSIS — Z96.1 PRESENCE OF INTRAOCULAR LENS: ICD-10-CM

## 2023-08-07 DIAGNOSIS — H35.373 PUCKERING OF MACULA, BILATERAL: ICD-10-CM

## 2023-08-07 PROCEDURE — 92134 CPTRZ OPH DX IMG PST SGM RTA: CPT | Performed by: OPHTHALMOLOGY

## 2023-08-07 PROCEDURE — 99213 OFFICE O/P EST LOW 20 MIN: CPT | Performed by: OPHTHALMOLOGY

## 2023-08-07 ASSESSMENT — INTRAOCULAR PRESSURE
OS: 13
OD: 17

## 2024-05-17 ENCOUNTER — OFFICE VISIT (OUTPATIENT)
Dept: URBAN - METROPOLITAN AREA CLINIC 48 | Facility: CLINIC | Age: 69
End: 2024-05-17
Payer: MEDICARE

## 2024-05-17 DIAGNOSIS — E11.3553 TYPE 2 DIABETES WITH STABLE PROLIF DIABETIC RTNOP, BILATERAL: Primary | ICD-10-CM

## 2024-05-17 PROCEDURE — 92134 CPTRZ OPH DX IMG PST SGM RTA: CPT | Performed by: STUDENT IN AN ORGANIZED HEALTH CARE EDUCATION/TRAINING PROGRAM

## 2024-05-17 PROCEDURE — 99214 OFFICE O/P EST MOD 30 MIN: CPT | Performed by: STUDENT IN AN ORGANIZED HEALTH CARE EDUCATION/TRAINING PROGRAM

## 2024-05-17 ASSESSMENT — INTRAOCULAR PRESSURE
OD: 17
OS: 17

## 2024-06-25 ENCOUNTER — OFFICE VISIT (OUTPATIENT)
Dept: URBAN - METROPOLITAN AREA CLINIC 49 | Facility: LOCATION | Age: 69
End: 2024-06-25
Payer: MEDICARE

## 2024-06-25 DIAGNOSIS — H35.373 PUCKERING OF MACULA, BILATERAL: ICD-10-CM

## 2024-06-25 DIAGNOSIS — E11.3593 TYPE 2 DIABETES MELLITUS W/ PROLIFERATIVE DIABETIC RETINOPATHY W/O MACULAR EDEMA, BILATERAL: Primary | ICD-10-CM

## 2024-06-25 PROCEDURE — 92014 COMPRE OPH EXAM EST PT 1/>: CPT | Performed by: OPHTHALMOLOGY

## 2024-06-25 PROCEDURE — 92134 CPTRZ OPH DX IMG PST SGM RTA: CPT | Performed by: OPHTHALMOLOGY

## 2024-06-25 ASSESSMENT — INTRAOCULAR PRESSURE
OD: 14
OS: 36
OS: 16
OD: 33

## 2025-02-24 ENCOUNTER — OFFICE VISIT (OUTPATIENT)
Dept: URBAN - METROPOLITAN AREA CLINIC 48 | Facility: CLINIC | Age: 70
End: 2025-02-24
Payer: MEDICARE

## 2025-02-24 DIAGNOSIS — E11.3593 TYPE 2 DIABETES MELLITUS WITH PROLIFERATIVE DIABETIC RETINOPATHY WITHOUT MACULAR EDEMA, BILATERAL: Primary | ICD-10-CM

## 2025-02-24 PROCEDURE — 92134 CPTRZ OPH DX IMG PST SGM RTA: CPT | Performed by: STUDENT IN AN ORGANIZED HEALTH CARE EDUCATION/TRAINING PROGRAM

## 2025-02-24 PROCEDURE — 99214 OFFICE O/P EST MOD 30 MIN: CPT | Performed by: STUDENT IN AN ORGANIZED HEALTH CARE EDUCATION/TRAINING PROGRAM

## 2025-02-24 ASSESSMENT — INTRAOCULAR PRESSURE
OD: 13
OS: 14